# Patient Record
Sex: MALE | Race: WHITE | NOT HISPANIC OR LATINO | URBAN - METROPOLITAN AREA
[De-identification: names, ages, dates, MRNs, and addresses within clinical notes are randomized per-mention and may not be internally consistent; named-entity substitution may affect disease eponyms.]

---

## 2018-10-11 ENCOUNTER — HOSPITAL ENCOUNTER (EMERGENCY)
Facility: HOSPITAL | Age: 83
End: 2018-10-11

## 2018-10-11 NOTE — ED NOTES
"From [facility] due to fall today, hit head. Staff denies LOC. Pt has dementia, baseline non-verbal, combative at times.  Per staff, pt has fallen twice in the past week. Last night had low blood pressure. Today staff states they gave him a \"bolus\" and pressure come up to 110/50.       Gerson Gonsales, RN  10/11/18 [id number]    "